# Patient Record
Sex: FEMALE | Race: WHITE | NOT HISPANIC OR LATINO | Employment: UNEMPLOYED | URBAN - METROPOLITAN AREA
[De-identification: names, ages, dates, MRNs, and addresses within clinical notes are randomized per-mention and may not be internally consistent; named-entity substitution may affect disease eponyms.]

---

## 2017-01-31 ENCOUNTER — OFFICE VISIT (OUTPATIENT)
Dept: OBSTETRICS AND GYNECOLOGY | Facility: CLINIC | Age: 32
End: 2017-01-31

## 2017-01-31 VITALS
HEIGHT: 63 IN | BODY MASS INDEX: 39.34 KG/M2 | HEART RATE: 83 BPM | DIASTOLIC BLOOD PRESSURE: 79 MMHG | SYSTOLIC BLOOD PRESSURE: 124 MMHG | WEIGHT: 222 LBS

## 2017-01-31 DIAGNOSIS — Z11.3 SCREEN FOR STD (SEXUALLY TRANSMITTED DISEASE): ICD-10-CM

## 2017-01-31 DIAGNOSIS — N89.8 VAGINAL DISCHARGE: ICD-10-CM

## 2017-01-31 DIAGNOSIS — N93.9 ABNORMAL UTERINE BLEEDING (AUB): ICD-10-CM

## 2017-01-31 DIAGNOSIS — Z01.419 ENCOUNTER FOR GYNECOLOGICAL EXAMINATION WITHOUT ABNORMAL FINDING: Primary | ICD-10-CM

## 2017-01-31 LAB
BASOPHILS # BLD AUTO: 0.01 10*3/MM3 (ref 0–0.2)
BASOPHILS NFR BLD AUTO: 0.1 % (ref 0–1.5)
EOSINOPHIL # BLD AUTO: 0.07 10*3/MM3 (ref 0–0.7)
EOSINOPHIL NFR BLD AUTO: 0.8 % (ref 0.3–6.2)
ERYTHROCYTE [DISTWIDTH] IN BLOOD BY AUTOMATED COUNT: 13.4 % (ref 11.7–13)
HCG INTACT+B SERPL-ACNC: <0.5 MIU/ML
HCT VFR BLD AUTO: 41.1 % (ref 35.6–45.5)
HGB BLD-MCNC: 13.3 G/DL (ref 11.9–15.5)
IMM GRANULOCYTES # BLD: 0.05 10*3/MM3 (ref 0–0.03)
IMM GRANULOCYTES NFR BLD: 0.6 % (ref 0–0.5)
LYMPHOCYTES # BLD AUTO: 2.68 10*3/MM3 (ref 0.9–4.8)
LYMPHOCYTES NFR BLD AUTO: 30.5 % (ref 19.6–45.3)
MCH RBC QN AUTO: 28.9 PG (ref 26.9–32)
MCHC RBC AUTO-ENTMCNC: 32.4 G/DL (ref 32.4–36.3)
MCV RBC AUTO: 89.2 FL (ref 80.5–98.2)
MONOCYTES # BLD AUTO: 0.35 10*3/MM3 (ref 0.2–1.2)
MONOCYTES NFR BLD AUTO: 4 % (ref 5–12)
NEUTROPHILS # BLD AUTO: 5.64 10*3/MM3 (ref 1.9–8.1)
NEUTROPHILS NFR BLD AUTO: 64 % (ref 42.7–76)
PLATELET # BLD AUTO: 342 10*3/MM3 (ref 140–500)
RBC # BLD AUTO: 4.61 10*6/MM3 (ref 3.9–5.2)
TSH SERPL DL<=0.005 MIU/L-ACNC: 2.53 MIU/ML (ref 0.27–4.2)
WBC # BLD AUTO: 8.8 10*3/MM3 (ref 4.5–10.7)

## 2017-01-31 PROCEDURE — 99395 PREV VISIT EST AGE 18-39: CPT | Performed by: OBSTETRICS & GYNECOLOGY

## 2017-01-31 NOTE — PROGRESS NOTES
GYN Annual Exam     CC- Here for annual exam.     Preethi Jules is a 31 y.o. female who presents for annual well woman exam. Periods are irregular, lasting several days.   Pt c/o irregular periods, unable to track for the past 2 mths. Bleeding is on/off and heavy at times. Pt has also had several bacterial infections int he past few months. Pt c/o painful intercourse. Pt's MIL has been diagnosed with Hep B and would like to be checked for that as well.     OB History      Para Term  AB TAB SAB Ectopic Multiple Living    2    1  1   1          Current contraception: none  History of abnormal Pap smear: yes - 6-7 yrs ago, NL paps since then.   Family history of uterine, colon or ovarian cancer: yes - paternal grandmother - ovarian   History of abnormal mammogram: no  Family history of breast cancer: no  Last Pap : 02/10/2015    Past Medical History   Diagnosis Date   • Anxiety    • MRSA carrier        Past Surgical History   Procedure Laterality Date   •  section     • Dilatation and curettage         No current outpatient prescriptions on file.    No Known Allergies    Social History   Substance Use Topics   • Smoking status: Former Smoker     Types: Cigarettes   • Smokeless tobacco: Never Used   • Alcohol use Yes       Family History   Problem Relation Age of Onset   • Ovarian cancer Paternal Grandmother    • Breast cancer Neg Hx    • Colon cancer Neg Hx    • Uterine cancer Neg Hx    • Deep vein thrombosis Neg Hx    • Pulmonary embolism Neg Hx        Review of Systems   Constitutional: Negative for chills, fatigue and fever.   HENT: Negative for congestion.    Eyes: Negative for visual disturbance.   Respiratory: Negative for shortness of breath.    Cardiovascular: Negative for chest pain.   Gastrointestinal: Negative for abdominal pain.   Endocrine: Negative for cold intolerance and heat intolerance.   Genitourinary: Positive for dyspareunia, menstrual problem, pelvic pain, vaginal bleeding and  "vaginal discharge. Negative for dysuria.   Musculoskeletal: Negative for arthralgias.   Skin: Negative for rash.   Allergic/Immunologic: Negative for immunocompromised state.   Neurological: Negative for dizziness.   Hematological: Negative for adenopathy.   Psychiatric/Behavioral: Negative for behavioral problems.       Visit Vitals   • /79   • Pulse 83   • Ht 63\" (160 cm)   • Wt 222 lb (101 kg)   • Breastfeeding No   • BMI 39.33 kg/m2       Physical Exam   Constitutional: She is oriented to person, place, and time. She appears well-developed and well-nourished.   HENT:   Head: Normocephalic and atraumatic.   Eyes: Conjunctivae are normal.   Neck: Normal range of motion. Neck supple. No thyromegaly present.   Cardiovascular: Normal rate and regular rhythm.    No murmur heard.  Pulmonary/Chest: Effort normal and breath sounds normal. Right breast exhibits no inverted nipple, no mass and no nipple discharge. Left breast exhibits no inverted nipple, no mass and no nipple discharge.   Abdominal: Soft. Bowel sounds are normal. She exhibits no distension. There is no tenderness.   Genitourinary: Vagina normal. Pelvic exam was performed with patient prone. There is no lesion on the right labia. There is no lesion on the left labia. Uterus is enlarged (7-8 weeks and anteverted - smooth ) and tender. Uterus is not fixed (Diminished mobility ). Cervix exhibits no motion tenderness. Right adnexum displays no mass and no tenderness. Left adnexum displays no mass and no tenderness. No bleeding in the vagina. No vaginal discharge found.   Musculoskeletal: She exhibits no edema.   Lymphadenopathy:        Right: No inguinal adenopathy present.        Left: No inguinal adenopathy present.   Neurological: She is alert and oriented to person, place, and time.   Skin: No rash noted.   Psychiatric: She has a normal mood and affect. Her behavior is normal.          Assessment     1) GYN annual well woman exam.   2) AUB -IMB, will " start work up associated with pelvic pain and painful intercourse.   Check cultres and labs, along with ultrasound return to initiate treatment.   3) Vaginal discharge, responded short term to flagyl, check full culture and treat   4) hep B - Family members     Plan     1) Breast Health - Clinical breast exam yearly, Self breast awareness monthly  2) Pap - up to date   3) Smoking status- non-smoker   4) Seat belts recommended  5) Follow up prn and one year.     Braulio Olson MD   1/31/2017  9:01 AM

## 2017-01-31 NOTE — MR AVS SNAPSHOT
"Preethi Jules   1/31/2017 8:45 AM   Office Visit    Dept Phone:  [phone]   Encounter #:  [id number]    Provider:  Braulio Olson MD   Department:  [facility]                Your Full Care Plan              Your Updated Medication List      Notice  As of 1/31/2017  9:40 AM    You have not been prescribed any medications.            We Performed the Following     CBC & Differential     HCG, B-subunit, Quantitative     Hepatitis B Surface Antibody     Hepatitis B Surface Antigen     NuSwab Vaginitis Plus     Prolactin     TSH Rfx On Abnormal To Free T4       You Were Diagnosed With        Codes Comments    Encounter for gynecological examination without abnormal finding    -  Primary ICD-10-CM: Z01.419  ICD-9-CM: V72.31     Abnormal uterine bleeding (AUB)     ICD-10-CM: N93.9  ICD-9-CM: 626.9     Vaginal discharge     ICD-10-CM: N89.8  ICD-9-CM: 623.5     Screen for STD (sexually transmitted disease)     ICD-10-CM: Z11.3  ICD-9-CM: V74.5       Instructions     None    Patient Instructions History      Upcoming Appointments     Visit Type Date Time Department    GYN FOLLOW UP 1/31/2017  8:45 AM IVORY REYNOLDS      MyChart Signup     Our records indicate that you have declined [facility] [employer] signup. If you would like to sign up for [employer], please email [email or web address] or call [phone] to obtain an activation code.             Other Info from Your Visit           Allergies     No Known Allergies      Reason for Visit     Gynecologic Exam     Menstrual Problem           Vital Signs     Blood Pressure Pulse Height Weight Breastfeeding? Body Mass Index    124/79 83 63\" (160 cm) 222 lb (101 kg) No 39.33 kg/m2    Smoking Status                   Former Smoker           Problems and Diagnoses Noted     Encounter for routine gynecological examination    -  Primary    Abnormal bleeding from uterus        Discharge from the vagina       " Screening for STDs (sexually transmitted diseases)

## 2017-02-01 ENCOUNTER — TELEPHONE (OUTPATIENT)
Dept: OBSTETRICS AND GYNECOLOGY | Facility: CLINIC | Age: 32
End: 2017-02-01

## 2017-02-01 ENCOUNTER — PROCEDURE VISIT (OUTPATIENT)
Dept: OBSTETRICS AND GYNECOLOGY | Facility: CLINIC | Age: 32
End: 2017-02-01

## 2017-02-01 DIAGNOSIS — N93.9 ABNORMAL UTERINE BLEEDING (AUB): ICD-10-CM

## 2017-02-01 DIAGNOSIS — R10.2 PELVIC PAIN: Primary | ICD-10-CM

## 2017-02-01 LAB
HBV SURFACE AB SER QL: REACTIVE
HBV SURFACE AG SERPL QL IA: NEGATIVE
PROLACTIN SERPL-MCNC: 6.8 NG/ML (ref 4.8–23.3)

## 2017-02-01 PROCEDURE — 76830 TRANSVAGINAL US NON-OB: CPT | Performed by: OBSTETRICS & GYNECOLOGY

## 2017-02-01 NOTE — TELEPHONE ENCOUNTER
----- Message from Enid Munguia MA sent at 2/1/2017 11:47 AM EST -----  L.m for pt/candace  ----- Message -----     From: Enid Munguia MA     Sent: 1/31/2017   5:07 PM       To: Enid Munguia MA L.m for pt/candace  ----- Message -----     From: Braulio Olson MD     Sent: 1/31/2017   4:38 PM       To: DIVYA Ovalle, her labs are normal. Please let her know. Thanks Dr. Olson

## 2017-02-01 NOTE — TELEPHONE ENCOUNTER
----- Message from Enid Munguia MA sent at 2/1/2017 11:47 AM EST -----  CARRIE.m for pt/candace  ----- Message -----     From: Braulio Olson MD     Sent: 2/1/2017  10:36 AM       To: Enid Munguia MA    Enid, Prolactin is normal. Appears to have immunity to Hep B (likely vaccinated as a kid).  No evidence of active infection. Please let her know. Thanks Dr. Olson

## 2017-02-01 NOTE — TELEPHONE ENCOUNTER
----- Message from Stephanie Viera sent at 2/1/2017  7:46 AM EST -----  Contact: Patient  Patient states she missed a call from you.  I looked in her chart and saw Dr. Olson's note on one of her lab results that all her blood work was normal.  I notified patient.

## 2017-02-04 LAB
A VAGINAE DNA VAG QL NAA+PROBE: NORMAL SCORE
BVAB2 DNA VAG QL NAA+PROBE: NORMAL SCORE
C ALBICANS DNA VAG QL NAA+PROBE: NEGATIVE
C GLABRATA DNA VAG QL NAA+PROBE: NEGATIVE
C TRACH RRNA SPEC QL NAA+PROBE: NEGATIVE
MEGA1 DNA VAG QL NAA+PROBE: NORMAL SCORE
N GONORRHOEA RRNA SPEC QL NAA+PROBE: NEGATIVE
T VAGINALIS RRNA SPEC QL NAA+PROBE: NEGATIVE

## 2017-02-08 ENCOUNTER — TELEPHONE (OUTPATIENT)
Dept: OBSTETRICS AND GYNECOLOGY | Facility: CLINIC | Age: 32
End: 2017-02-08

## 2017-02-08 NOTE — TELEPHONE ENCOUNTER
----- Message from Enid Munguia MA sent at 2/7/2017  5:23 PM EST -----  L.m for pt/candace  ----- Message -----     From: Braulio Olson MD     Sent: 2/6/2017  12:28 PM       To: DIVYA Ovalle, negative vaginal culture please review. Thanks Dr. Olson

## 2017-02-23 ENCOUNTER — PROCEDURE VISIT (OUTPATIENT)
Dept: OBSTETRICS AND GYNECOLOGY | Facility: CLINIC | Age: 32
End: 2017-02-23

## 2017-02-23 VITALS
HEIGHT: 63 IN | HEART RATE: 69 BPM | WEIGHT: 223 LBS | DIASTOLIC BLOOD PRESSURE: 71 MMHG | BODY MASS INDEX: 39.51 KG/M2 | SYSTOLIC BLOOD PRESSURE: 118 MMHG

## 2017-02-23 DIAGNOSIS — R93.89 THICKENED ENDOMETRIUM: Primary | ICD-10-CM

## 2017-02-23 DIAGNOSIS — N93.9 ABNORMAL UTERINE BLEEDING (AUB): ICD-10-CM

## 2017-02-23 LAB
B-HCG UR QL: NEGATIVE
INTERNAL NEGATIVE CONTROL: NEGATIVE
INTERNAL POSITIVE CONTROL: POSITIVE
Lab: NORMAL

## 2017-02-23 PROCEDURE — 58100 BIOPSY OF UTERUS LINING: CPT | Performed by: OBSTETRICS & GYNECOLOGY

## 2017-02-23 PROCEDURE — 99213 OFFICE O/P EST LOW 20 MIN: CPT | Performed by: OBSTETRICS & GYNECOLOGY

## 2017-02-23 PROCEDURE — 81025 URINE PREGNANCY TEST: CPT | Performed by: OBSTETRICS & GYNECOLOGY

## 2017-02-23 RX ORDER — NORGESTIMATE AND ETHINYL ESTRADIOL 0.25-0.035
1 KIT ORAL DAILY
Qty: 1 PACKAGE | Refills: 12 | Status: SHIPPED | OUTPATIENT
Start: 2017-02-23 | End: 2021-06-08

## 2017-02-23 NOTE — PROGRESS NOTES
"Subjective   Preethi Jules is a 31 y.o. female here for endometrial biopsy for thickened endometrium.     History of Present Illness     31 y.o. female who we worked up for abnormal uterine bleeding. Here to discuss results and finish testing.         Review of Systems   Constitutional: Negative for chills and fever.   Gastrointestinal: Negative for abdominal pain, nausea and vomiting.   Genitourinary: Positive for menstrual problem and pelvic pain. Negative for vaginal discharge.       Objective    Visit Vitals   • /71   • Pulse 69   • Ht 63\" (160 cm)   • Wt 223 lb (101 kg)   • LMP 02/22/2017   • Breastfeeding No   • BMI 39.5 kg/m2       Physical Exam   Constitutional: She appears well-developed and well-nourished. No distress.   Abdominal: Soft. Bowel sounds are normal. She exhibits no distension. There is no tenderness.   Genitourinary: Pelvic exam was performed with patient prone. There is no lesion on the right labia. There is no lesion on the left labia. Cervix exhibits no motion tenderness. There is bleeding in the vagina. No vaginal discharge found.   Lymphadenopathy:        Right: No inguinal adenopathy present.        Left: No inguinal adenopathy present.     Endometrial Biopsy  Date/Time: 2/23/2017 2:05 PM  Performed by: VINCENT HERRMANN  Authorized by: VINCENT HERRMANN   Preparation: Patient was prepped and draped in the usual sterile fashion.  Local anesthesia used: no    Anesthesia:  Local anesthesia used: no  Sedation:  Patient sedated: no    Patient tolerance of procedure: Had some trouble as desribed below with pain.  Comments: Cervix isolated and washed with betadine x 2.   Cervix grasped with tenaculum and pulled forward.   Pipelle passed to 3-4 cm and stopped.   Sound used to easily pass to 8 cm  Redirected Pipelle to 7-8 cm   Small amount of tissue obtained.   Had trouble with pain at internal cervical os, but able to tolerate procedure in the end.   Will send specimen for study. "   Follow up based on results.           Assessment/Plan     1) AUB-IMB, suspect ovarian dysfunction/PCOS  Work up concerning for EL > 1 cm, here to do biopsy and start treatment.   Reviewed findings and need for tissue as well as treatment. Ok to start OCP  How to start  Common/life threatening issues  Efficiency all reviewed.     2) thick EL on ultrasound  Tissue obtained to rule out hyperplasia as above.     Braulio Olson MD  2/23/2017  2:11 PM

## 2017-02-27 LAB
DX ICD CODE: NORMAL
PATH REPORT.FINAL DX SPEC: NORMAL
PATH REPORT.GROSS SPEC: NORMAL
PATH REPORT.SITE OF ORIGIN SPEC: NORMAL
PATHOLOGIST NAME: NORMAL
PAYMENT PROCEDURE: NORMAL

## 2017-02-28 ENCOUNTER — TELEPHONE (OUTPATIENT)
Dept: OBSTETRICS AND GYNECOLOGY | Facility: CLINIC | Age: 32
End: 2017-02-28

## 2017-02-28 NOTE — TELEPHONE ENCOUNTER
----- Message from Braulio Olson MD sent at 2/27/2017  4:42 PM EST -----  Enid, her biopsy was normal. Please let her know. Thanks Dr. Olson

## 2017-03-01 ENCOUNTER — TELEPHONE (OUTPATIENT)
Dept: OBSTETRICS AND GYNECOLOGY | Facility: CLINIC | Age: 32
End: 2017-03-01

## 2017-03-01 RX ORDER — PROMETHAZINE HYDROCHLORIDE 25 MG/1
25 TABLET ORAL EVERY 6 HOURS PRN
Qty: 30 TABLET | Refills: 2 | Status: SHIPPED | OUTPATIENT
Start: 2017-03-01 | End: 2021-06-08

## 2017-03-22 ENCOUNTER — TELEPHONE (OUTPATIENT)
Dept: OBSTETRICS AND GYNECOLOGY | Facility: CLINIC | Age: 32
End: 2017-03-22

## 2017-03-22 NOTE — TELEPHONE ENCOUNTER
Enid,     I talked with her and this is expected as we get control of her cycle. She is taking tylenol not motrin. Encouraged to get both and start taking alternating doses. Ok to give note for work today and potentially tomorrow. Please call and arrange note.      Thanks     Dr. Olson    ----- Message from Enid Munguia MA sent at 3/22/2017  8:33 AM EDT -----  Contact: PT CALL       ----- Message -----     From: Ta Gray     Sent: 3/22/2017   8:25 AM       To: Braulio Olson MD, Enid Munguia MA    PT CALLED IN SHE IS HAVING VERY BAD PERIOD. THIS HER FIRST MONTH ON BIRTH CONTROL PILLS AND SHE IS WONDERING IF YOU HAVE ANY SUGGESTIONS ON WHAT SHE CAN TAKE FOR THE CRAMPING BECAUSE SHE STATES SHE IS IN PAIN WHEN SHE TRIES TO STAND  OR MOVE AROUND? SHE ,INFORMED ME THAT SHE HAS BEEN TAKING IBUPROFEN  FOR 3 DAYS AND THE PAIN HAS NOT SUBSIDED.  PLEASE [facility] PT @[phone].    THANK YOU-    TA

## 2017-05-03 ENCOUNTER — TELEPHONE (OUTPATIENT)
Dept: OBSTETRICS AND GYNECOLOGY | Facility: CLINIC | Age: 32
End: 2017-05-03

## 2017-09-19 ENCOUNTER — TREATMENT (OUTPATIENT)
Dept: PHYSICAL THERAPY | Facility: CLINIC | Age: 32
End: 2017-09-19

## 2017-09-19 DIAGNOSIS — Z02.1 DRUG SCREENING, PRE-EMPLOYMENT: Primary | ICD-10-CM

## 2017-09-19 PROCEDURE — PDS: Performed by: PHYSICAL THERAPIST

## 2019-06-11 ENCOUNTER — TELEPHONE (OUTPATIENT)
Dept: OBSTETRICS AND GYNECOLOGY | Facility: CLINIC | Age: 34
End: 2019-06-11

## 2019-06-11 NOTE — TELEPHONE ENCOUNTER
Called pt about n/s on 6/7/19. Pt states she had a lot going on that day and she forgot.  Pt states she is no longer having the issue. She wanted to schedule for an annual exam, scheduled.[email or web address]

## 2019-08-09 ENCOUNTER — TELEPHONE (OUTPATIENT)
Dept: OBSTETRICS AND GYNECOLOGY | Facility: CLINIC | Age: 34
End: 2019-08-09

## 2020-06-16 ENCOUNTER — LAB REQUISITION (OUTPATIENT)
Dept: LAB | Facility: HOSPITAL | Age: 35
End: 2020-06-16

## 2020-06-16 DIAGNOSIS — N20.1 CALCULUS OF URETER: ICD-10-CM

## 2020-06-16 PROCEDURE — 82365 CALCULUS SPECTROSCOPY: CPT | Performed by: UROLOGY

## 2020-06-26 LAB
CAHPO4 CRY STONE QL IR: 20 %
COLOR STONE: NORMAL
COM CRY STONE QL IR: 40 %
COMPN STONE: NORMAL
HYDROXYAPATITE: 40 %
LABORATORY COMMENT REPORT: NORMAL
Lab: NORMAL
Lab: NORMAL
PHOTO: NORMAL
SIZE STONE: NORMAL MM
SPECIMEN SOURCE: NORMAL
WT STONE: 20 MG

## 2020-10-16 ENCOUNTER — TELEPHONE (OUTPATIENT)
Dept: OBSTETRICS AND GYNECOLOGY | Facility: CLINIC | Age: 35
End: 2020-10-16

## 2021-06-08 ENCOUNTER — OFFICE VISIT (OUTPATIENT)
Dept: OBSTETRICS AND GYNECOLOGY | Facility: CLINIC | Age: 36
End: 2021-06-08

## 2021-06-08 VITALS
WEIGHT: 185 LBS | HEIGHT: 64 IN | HEART RATE: 81 BPM | SYSTOLIC BLOOD PRESSURE: 128 MMHG | BODY MASS INDEX: 31.58 KG/M2 | DIASTOLIC BLOOD PRESSURE: 86 MMHG

## 2021-06-08 DIAGNOSIS — Z01.419 ENCOUNTER FOR GYNECOLOGICAL EXAMINATION WITHOUT ABNORMAL FINDING: Primary | ICD-10-CM

## 2021-06-08 DIAGNOSIS — R10.2 PELVIC PAIN: ICD-10-CM

## 2021-06-08 PROCEDURE — 99212 OFFICE O/P EST SF 10 MIN: CPT | Performed by: OBSTETRICS & GYNECOLOGY

## 2021-06-08 PROCEDURE — 99385 PREV VISIT NEW AGE 18-39: CPT | Performed by: OBSTETRICS & GYNECOLOGY

## 2021-06-08 NOTE — PROGRESS NOTES
GYN Annual Exam     CC- Here for annual exam.     Preethi Jules is a 35 y.o. female who presents for annual well woman exam. Periods are regular every 4-6 weeks, lasting 5 days. Dysmenorrhea:severe, occurring first 1-2 days of flow getting better as the period progresses.  Cyclic symptoms include none. No intermenstrual bleeding, spotting, or discharge.  Pt has a bulge or knot in the pubic area. Pt noticed a few months ago. Pt has a pain the same area during climax.     OB History        2    Para        Term                AB   1    Living   1       SAB   1    TAB        Ectopic        Molar        Multiple        Live Births                    Current contraception: none  History of abnormal Pap smear: Yes, no treatment required  Family history of uterine, colon or ovarian cancer: yes - paternal grandmother - ovarian cancer   History of abnormal mammogram: no  Family history of breast cancer: no  Last Pap : - NL    Past Medical History:   Diagnosis Date   • Anxiety    • Kidney stones    • MRSA carrier        Past Surgical History:   Procedure Laterality Date   • CARPAL TUNNEL RELEASE     •  SECTION     • DILATATION AND CURETTAGE         No current outpatient medications on file.    No Known Allergies    Social History     Tobacco Use   • Smoking status: Former Smoker     Types: Cigarettes   • Smokeless tobacco: Never Used   Substance Use Topics   • Alcohol use: Yes   • Drug use: No       Family History   Problem Relation Age of Onset   • Ovarian cancer Paternal Grandmother    • Lung cancer Father    • Breast cancer Neg Hx    • Colon cancer Neg Hx    • Uterine cancer Neg Hx    • Deep vein thrombosis Neg Hx    • Pulmonary embolism Neg Hx        Review of Systems   Constitutional: Negative for chills and fever.   Gastrointestinal: Negative for abdominal pain, constipation and diarrhea.   Genitourinary: Positive for pelvic pain. Negative for menstrual problem, vaginal bleeding and vaginal  "discharge.   All other systems reviewed and are negative.      /86   Pulse 81   Ht 162.6 cm (64\")   Wt 83.9 kg (185 lb)   Breastfeeding No   BMI 31.76 kg/m²     Physical Exam  Constitutional:       General: She is not in acute distress.     Appearance: She is well-developed and normal weight.   Genitourinary:      Pelvic exam was performed with patient supine.      Vulva, vagina, right adnexa and left adnexa normal.      No vulval lesion or Bartholin's cyst noted.      No inguinal adenopathy present in the right or left side.     No vaginal discharge or bleeding.      No cervical motion tenderness or friability.      Uterus is enlarged and irregular.      Uterus is not tender.      No uterine mass detected.     Uterus is anteverted and regular.      No right or left adnexal mass present.      Right adnexa not tender or full.      Left adnexa not tender or full.   HENT:      Head: Normocephalic and atraumatic.      Nose: Nose normal.   Eyes:      Conjunctiva/sclera: Conjunctivae normal.      Pupils: Pupils are equal, round, and reactive to light.   Neck:      Thyroid: No thyromegaly.   Cardiovascular:      Rate and Rhythm: Normal rate and regular rhythm.      Heart sounds: Normal heart sounds. No murmur heard.     Pulmonary:      Effort: Pulmonary effort is normal. No respiratory distress.      Breath sounds: Normal breath sounds.   Chest:      Breasts:         Right: No inverted nipple, mass or nipple discharge.         Left: No inverted nipple, mass or nipple discharge.   Abdominal:      General: Abdomen is flat. There is no distension.      Palpations: Abdomen is soft.      Tenderness: There is no abdominal tenderness.   Musculoskeletal:         General: No deformity. Normal range of motion.      Cervical back: Normal range of motion and neck supple.      Right lower leg: No edema.      Left lower leg: No edema.   Lymphadenopathy:      Lower Body: No right inguinal adenopathy. No left inguinal " "adenopathy.   Neurological:      Mental Status: She is alert and oriented to person, place, and time.   Skin:     General: Skin is warm and dry.      Findings: No erythema.   Psychiatric:         Behavior: Behavior normal.         Thought Content: Thought content normal.         Judgment: Judgment normal.   Vitals reviewed. Exam conducted with a chaperone present.               Assessment     Diagnoses and all orders for this visit:    1. Encounter for gynecological examination without abnormal finding (Primary)  -     IGP, Apt HPV,rfx 16 / 18,45    2. Pelvic pain  -     Urine Culture - , Urine, Clean Catch  -     US Non-ob Transvaginal    1) GYN exam   Updated with her  2) Pelvic pain and \"knot\"  Uterus in 2017 was 9 cm long with 2 cm anterior fibroid   Check again as feels larger today   Options reviewed. (hormonal suppression vs surgical treatments)      Plan     1) Breast Health - Clinical breast exam yearly, Discussed American cancer society recommendations for breast cancer screening, and Self breast awareness monthly  2) Pap - Updated today   3) Smoking status- non-smoker   4) Encouraged to be wary of information obtained via social media and internet based on source and search.  5) Follow up prn and one year.       Braulio Olson MD   6/8/2021  15:13 EDT  "

## 2021-06-11 LAB
CYTOLOGIST CVX/VAG CYTO: NORMAL
CYTOLOGY CVX/VAG DOC CYTO: NORMAL
CYTOLOGY CVX/VAG DOC THIN PREP: NORMAL
DX ICD CODE: NORMAL
HIV 1 & 2 AB SER-IMP: NORMAL
HPV I/H RISK 4 DNA CVX QL PROBE+SIG AMP: NEGATIVE
OTHER STN SPEC: NORMAL
STAT OF ADQ CVX/VAG CYTO-IMP: NORMAL

## 2021-06-28 ENCOUNTER — OFFICE VISIT (OUTPATIENT)
Dept: OBSTETRICS AND GYNECOLOGY | Facility: CLINIC | Age: 36
End: 2021-06-28

## 2021-06-28 VITALS
WEIGHT: 181 LBS | SYSTOLIC BLOOD PRESSURE: 130 MMHG | HEART RATE: 75 BPM | BODY MASS INDEX: 30.9 KG/M2 | HEIGHT: 64 IN | DIASTOLIC BLOOD PRESSURE: 78 MMHG

## 2021-06-28 DIAGNOSIS — Z30.2 ENCOUNTER FOR STERILIZATION: ICD-10-CM

## 2021-06-28 DIAGNOSIS — R10.2 PELVIC PAIN: Primary | ICD-10-CM

## 2021-06-28 DIAGNOSIS — D25.1 INTRAMURAL UTERINE FIBROID: ICD-10-CM

## 2021-06-28 DIAGNOSIS — N93.9 ABNORMAL UTERINE BLEEDING (AUB): ICD-10-CM

## 2021-06-28 PROCEDURE — 99214 OFFICE O/P EST MOD 30 MIN: CPT | Performed by: OBSTETRICS & GYNECOLOGY

## 2021-06-28 NOTE — PROGRESS NOTES
"Subjective    is a 35 y.o. female following up from US for pelvic pain.     Chief Complaint   Patient presents with   • Follow-up        HPI     35 y.o.    Here for bleeding and pelvic pain   In 2017 had 2 cm fibroid       Review of Systems   Constitutional: Negative for chills and fever.   Gastrointestinal: Negative for abdominal pain.   Genitourinary: Positive for menstrual problem and pelvic pain. Negative for dysuria, vaginal bleeding and vaginal discharge.        Objective   /78   Pulse 75   Ht 162.6 cm (64\")   Wt 82.1 kg (181 lb)   Breastfeeding No   BMI 31.07 kg/m²   Physical Exam  Constitutional:       General: She is not in acute distress.     Appearance: Normal appearance. She is well-developed and normal weight.   Neck:      Thyroid: No thyromegaly.   Pulmonary:      Effort: No respiratory distress.   Abdominal:      General: Abdomen is flat. There is no distension.      Palpations: Abdomen is soft.      Tenderness: There is no abdominal tenderness.   Musculoskeletal:      Right lower leg: No edema.      Left lower leg: No edema.   Neurological:      Mental Status: She is alert and oriented to person, place, and time.   Skin:     General: Skin is warm and dry.   Psychiatric:         Behavior: Behavior normal.         Thought Content: Thought content normal.         Judgment: Judgment normal.   Vitals reviewed.         Ultrasound   9.3 cm uterus (was 9.0 cm in 2017)  Fibroid 2.8 cm (was 2.0 cm in 2017)       Assessment/Plan   Diagnoses and all orders for this visit:    1. Pelvic pain (Primary)  -     Case Request; Standing  -     CBC and Differential; Future  -     Case Request    2. Abnormal uterine bleeding (AUB)  -     Case Request; Standing  -     CBC and Differential; Future  -     Case Request    3. Intramural uterine fibroid  -     Case Request; Standing  -     CBC and Differential; Future  -     Case Request    4. Encounter for sterilization  -     Case Request; Standing  -     " CBC and Differential; Future  -     Case Request    Other orders  -     Follow Anesthesia Guidelines / Standing Orders; Future  -     Chlorhexidine Skin Prep; Future  -     Follow Anesthesia Guidelines / Standing Orders; Standing  -     Obtain informed consent; Standing  -     Verify NPO Status; Standing  -     Verify The Time Patient Completed ERAS Hydration Drink; Standing  -     Verify / Perform Chlorhexidine Skin Prep; Standing    1) Pelvic pain and AUB  Likely related to uterine enlargement from fibroid   Initial treatment reviewed.   OCP, IUD or Ablation   Wants to trial ablation   Management options discussed at length today including expectant, medical, and surgical. Patient elects for an endometrial ablation. Discussed high rates of satisfaction (around 90 %) but also the potential for failure and persistent heavy or painful periods with potential need for future surgery, including hysterectomy. Patient verbalized understanding. Discussed risks of surgery including bleeding, transfusion, infection, scaring, dyspareunia, irregular periods post op, post op pain, uterine perforation and potential need for surgical correction, inadvertent injury to GI/ structures and potential need for surgical correction, DVT, anesthesia complications, organ failure, nerve damage, and death. Discussed preop process and typicaly post op/recovery. Will plan hysteroscopy, D&C, ablation.     Needs sterilization for contraception   Various options discussed including natural family planning and abstinence. Pt still wishes for permanent sterilization.The risks, benefits, and alternatives were discussed including surgery risks such as failure of sterilization method with future pregnancy, ectopic pregnancy, bleeding, infection, scar, pain, wound breakdown, conversion to open approach, GI/ injury, DVT, nerve damage, organ failure, anesthesia complications, and death. Discussed preop and typical recovery. Discussed risk of future  pregnancy and regret. All questions answered.     Desires to do 8/13/21   Will look to schedule then     Braulio Olson MD   6/28/2021  16:15 EDT

## 2021-06-30 PROBLEM — Z30.2 ENCOUNTER FOR STERILIZATION: Status: ACTIVE | Noted: 2021-06-30

## 2021-06-30 PROBLEM — N93.9 ABNORMAL UTERINE BLEEDING (AUB): Status: ACTIVE | Noted: 2021-06-30

## 2021-06-30 PROBLEM — D25.1 INTRAMURAL UTERINE FIBROID: Status: ACTIVE | Noted: 2021-06-30

## 2021-06-30 PROBLEM — R10.2 PELVIC PAIN: Status: ACTIVE | Noted: 2021-06-30

## 2021-08-11 ENCOUNTER — PRE-ADMISSION TESTING (OUTPATIENT)
Dept: PREADMISSION TESTING | Facility: HOSPITAL | Age: 36
End: 2021-08-11

## 2021-08-11 ENCOUNTER — TELEPHONE (OUTPATIENT)
Dept: OBSTETRICS AND GYNECOLOGY | Facility: CLINIC | Age: 36
End: 2021-08-11

## 2021-08-11 VITALS
DIASTOLIC BLOOD PRESSURE: 76 MMHG | HEIGHT: 64 IN | SYSTOLIC BLOOD PRESSURE: 110 MMHG | HEART RATE: 64 BPM | TEMPERATURE: 97.5 F | BODY MASS INDEX: 32.1 KG/M2 | WEIGHT: 188 LBS | OXYGEN SATURATION: 100 % | RESPIRATION RATE: 20 BRPM

## 2021-08-11 DIAGNOSIS — F41.9 ANXIETY DUE TO INVASIVE PROCEDURE: Primary | ICD-10-CM

## 2021-08-11 LAB
ANION GAP SERPL CALCULATED.3IONS-SCNC: 11.4 MMOL/L (ref 5–15)
BUN SERPL-MCNC: 9 MG/DL (ref 6–20)
BUN/CREAT SERPL: 13.8 (ref 7–25)
CALCIUM SPEC-SCNC: 9.2 MG/DL (ref 8.6–10.5)
CHLORIDE SERPL-SCNC: 102 MMOL/L (ref 98–107)
CO2 SERPL-SCNC: 23.6 MMOL/L (ref 22–29)
CREAT SERPL-MCNC: 0.65 MG/DL (ref 0.57–1)
GFR SERPL CREATININE-BSD FRML MDRD: 105 ML/MIN/1.73
GLUCOSE SERPL-MCNC: 90 MG/DL (ref 65–99)
HCG SERPL QL: NEGATIVE
POTASSIUM SERPL-SCNC: 4.5 MMOL/L (ref 3.5–5.2)
SARS-COV-2 ORF1AB RESP QL NAA+PROBE: NOT DETECTED
SODIUM SERPL-SCNC: 137 MMOL/L (ref 136–145)

## 2021-08-11 PROCEDURE — 84703 CHORIONIC GONADOTROPIN ASSAY: CPT

## 2021-08-11 PROCEDURE — 87086 URINE CULTURE/COLONY COUNT: CPT | Performed by: OBSTETRICS & GYNECOLOGY

## 2021-08-11 PROCEDURE — 80048 BASIC METABOLIC PNL TOTAL CA: CPT

## 2021-08-11 PROCEDURE — 85025 COMPLETE CBC W/AUTO DIFF WBC: CPT | Performed by: OBSTETRICS & GYNECOLOGY

## 2021-08-11 PROCEDURE — C9803 HOPD COVID-19 SPEC COLLECT: HCPCS | Performed by: NURSE PRACTITIONER

## 2021-08-11 PROCEDURE — 36415 COLL VENOUS BLD VENIPUNCTURE: CPT

## 2021-08-11 PROCEDURE — U0004 COV-19 TEST NON-CDC HGH THRU: HCPCS | Performed by: NURSE PRACTITIONER

## 2021-08-11 RX ORDER — OMEPRAZOLE 40 MG/1
40 CAPSULE, DELAYED RELEASE ORAL DAILY PRN
COMMUNITY
End: 2021-08-27

## 2021-08-11 RX ORDER — CHLORHEXIDINE GLUCONATE 500 MG/1
1 CLOTH TOPICAL TAKE AS DIRECTED
COMMUNITY
End: 2021-08-18

## 2021-08-11 RX ORDER — DIAZEPAM 5 MG/1
5 TABLET ORAL ONCE
Qty: 1 TABLET | Refills: 0 | Status: SHIPPED | OUTPATIENT
Start: 2021-08-11 | End: 2021-08-11

## 2021-08-11 RX ORDER — ACETAMINOPHEN 500 MG
1000 TABLET ORAL EVERY 6 HOURS PRN
COMMUNITY
End: 2021-08-27

## 2021-08-11 NOTE — TELEPHONE ENCOUNTER
Enid,     I sent one pill for tonight to help. Should not take anything like that in the morning that will interfere with the drugs they are giving her. They will be fine to help her once she is settled at the hospital. Please let her know    Thanks,   Dr. Olson

## 2021-08-11 NOTE — TELEPHONE ENCOUNTER
Good afternoon,  Patient is calling to see if provider can call in something to help calm her nerves before her surgery on Friday. Patient is very nervous about this upcoming surgery and does not want to come in morning of and her BP be to high because she is nervous and would like something to take day before to help with that if possible.      Please advise,  Thank you

## 2021-08-12 ENCOUNTER — TELEPHONE (OUTPATIENT)
Dept: OBSTETRICS AND GYNECOLOGY | Facility: CLINIC | Age: 36
End: 2021-08-12

## 2021-08-12 LAB — BACTERIA SPEC AEROBE CULT: NORMAL

## 2021-08-12 NOTE — TELEPHONE ENCOUNTER
Dr. Geremias Hughes moving her  Friday (tomorrow). Can we move her up to 1 PM?    Thanks,   Dr. Olson

## 2021-08-12 NOTE — TELEPHONE ENCOUNTER
----- Message from Braulio Olson MD sent at 8/12/2021 10:56 AM EDT -----  Enid, normal urine culture. Please let her know. Thanks Dr. Olson

## 2021-08-13 ENCOUNTER — ANESTHESIA EVENT (OUTPATIENT)
Dept: PERIOP | Facility: HOSPITAL | Age: 36
End: 2021-08-13

## 2021-08-13 ENCOUNTER — HOSPITAL ENCOUNTER (OUTPATIENT)
Facility: HOSPITAL | Age: 36
Setting detail: HOSPITAL OUTPATIENT SURGERY
Discharge: HOME | End: 2021-08-13
Attending: OBSTETRICS & GYNECOLOGY | Admitting: OBSTETRICS & GYNECOLOGY

## 2021-08-13 ENCOUNTER — ANESTHESIA (OUTPATIENT)
Dept: PERIOP | Facility: HOSPITAL | Age: 36
End: 2021-08-13

## 2021-08-13 VITALS
SYSTOLIC BLOOD PRESSURE: 111 MMHG | OXYGEN SATURATION: 99 % | DIASTOLIC BLOOD PRESSURE: 69 MMHG | RESPIRATION RATE: 16 BRPM | WEIGHT: 186.6 LBS | BODY MASS INDEX: 31.86 KG/M2 | TEMPERATURE: 97.6 F | HEART RATE: 70 BPM | HEIGHT: 64 IN

## 2021-08-13 DIAGNOSIS — N93.9 ABNORMAL UTERINE BLEEDING (AUB): ICD-10-CM

## 2021-08-13 DIAGNOSIS — Z30.2 ENCOUNTER FOR STERILIZATION: Primary | ICD-10-CM

## 2021-08-13 DIAGNOSIS — D25.1 INTRAMURAL UTERINE FIBROID: ICD-10-CM

## 2021-08-13 DIAGNOSIS — R10.2 PELVIC PAIN: ICD-10-CM

## 2021-08-13 PROCEDURE — 58563 HYSTEROSCOPY ABLATION: CPT | Performed by: OBSTETRICS & GYNECOLOGY

## 2021-08-13 PROCEDURE — 25010000002 NEOSTIGMINE 5 MG/10ML SOLUTION: Performed by: NURSE ANESTHETIST, CERTIFIED REGISTERED

## 2021-08-13 PROCEDURE — 58670 LAPAROSCOPY TUBAL CAUTERY: CPT | Performed by: OBSTETRICS & GYNECOLOGY

## 2021-08-13 PROCEDURE — 25010000002 ONDANSETRON PER 1 MG: Performed by: ANESTHESIOLOGY

## 2021-08-13 PROCEDURE — 25010000002 DEXAMETHASONE PER 1 MG: Performed by: ANESTHESIOLOGY

## 2021-08-13 PROCEDURE — 25010000002 HYDROMORPHONE PER 4 MG: Performed by: NURSE ANESTHETIST, CERTIFIED REGISTERED

## 2021-08-13 PROCEDURE — 25010000002 HYDROMORPHONE PER 4 MG: Performed by: ANESTHESIOLOGY

## 2021-08-13 PROCEDURE — 25010000002 FENTANYL CITRATE (PF) 50 MCG/ML SOLUTION: Performed by: ANESTHESIOLOGY

## 2021-08-13 PROCEDURE — 25010000002 PROPOFOL 10 MG/ML EMULSION: Performed by: ANESTHESIOLOGY

## 2021-08-13 PROCEDURE — 25010000002 MIDAZOLAM PER 1 MG: Performed by: ANESTHESIOLOGY

## 2021-08-13 PROCEDURE — 25010000002 KETOROLAC TROMETHAMINE PER 15 MG: Performed by: ANESTHESIOLOGY

## 2021-08-13 RX ORDER — KETOROLAC TROMETHAMINE 30 MG/ML
INJECTION, SOLUTION INTRAMUSCULAR; INTRAVENOUS AS NEEDED
Status: DISCONTINUED | OUTPATIENT
Start: 2021-08-13 | End: 2021-08-13 | Stop reason: SURG

## 2021-08-13 RX ORDER — DIPHENHYDRAMINE HYDROCHLORIDE 50 MG/ML
12.5 INJECTION INTRAMUSCULAR; INTRAVENOUS
Status: DISCONTINUED | OUTPATIENT
Start: 2021-08-13 | End: 2021-08-13 | Stop reason: HOSPADM

## 2021-08-13 RX ORDER — ONDANSETRON 2 MG/ML
4 INJECTION INTRAMUSCULAR; INTRAVENOUS ONCE AS NEEDED
Status: COMPLETED | OUTPATIENT
Start: 2021-08-13 | End: 2021-08-13

## 2021-08-13 RX ORDER — IBUPROFEN 600 MG/1
600 TABLET ORAL ONCE AS NEEDED
Status: DISCONTINUED | OUTPATIENT
Start: 2021-08-13 | End: 2021-08-13 | Stop reason: HOSPADM

## 2021-08-13 RX ORDER — ROCURONIUM BROMIDE 10 MG/ML
INJECTION, SOLUTION INTRAVENOUS AS NEEDED
Status: DISCONTINUED | OUTPATIENT
Start: 2021-08-13 | End: 2021-08-13 | Stop reason: SURG

## 2021-08-13 RX ORDER — PROMETHAZINE HYDROCHLORIDE 25 MG/1
25 SUPPOSITORY RECTAL ONCE AS NEEDED
Status: DISCONTINUED | OUTPATIENT
Start: 2021-08-13 | End: 2021-08-13 | Stop reason: HOSPADM

## 2021-08-13 RX ORDER — HYDRALAZINE HYDROCHLORIDE 20 MG/ML
5 INJECTION INTRAMUSCULAR; INTRAVENOUS
Status: DISCONTINUED | OUTPATIENT
Start: 2021-08-13 | End: 2021-08-13 | Stop reason: HOSPADM

## 2021-08-13 RX ORDER — PROMETHAZINE HYDROCHLORIDE 25 MG/1
25 TABLET ORAL ONCE AS NEEDED
Status: DISCONTINUED | OUTPATIENT
Start: 2021-08-13 | End: 2021-08-13 | Stop reason: HOSPADM

## 2021-08-13 RX ORDER — LIDOCAINE HYDROCHLORIDE 20 MG/ML
INJECTION, SOLUTION INFILTRATION; PERINEURAL AS NEEDED
Status: DISCONTINUED | OUTPATIENT
Start: 2021-08-13 | End: 2021-08-13 | Stop reason: SURG

## 2021-08-13 RX ORDER — LIDOCAINE HYDROCHLORIDE 10 MG/ML
0.5 INJECTION, SOLUTION EPIDURAL; INFILTRATION; INTRACAUDAL; PERINEURAL ONCE AS NEEDED
Status: DISCONTINUED | OUTPATIENT
Start: 2021-08-13 | End: 2021-08-13 | Stop reason: HOSPADM

## 2021-08-13 RX ORDER — NALOXONE HCL 0.4 MG/ML
0.2 VIAL (ML) INJECTION AS NEEDED
Status: DISCONTINUED | OUTPATIENT
Start: 2021-08-13 | End: 2021-08-13 | Stop reason: HOSPADM

## 2021-08-13 RX ORDER — SODIUM CHLORIDE 9 MG/ML
INJECTION, SOLUTION INTRAVENOUS AS NEEDED
Status: DISCONTINUED | OUTPATIENT
Start: 2021-08-13 | End: 2021-08-13 | Stop reason: HOSPADM

## 2021-08-13 RX ORDER — SODIUM CHLORIDE 0.9 % (FLUSH) 0.9 %
3-10 SYRINGE (ML) INJECTION AS NEEDED
Status: DISCONTINUED | OUTPATIENT
Start: 2021-08-13 | End: 2021-08-13 | Stop reason: HOSPADM

## 2021-08-13 RX ORDER — BUPIVACAINE HYDROCHLORIDE AND EPINEPHRINE 5; 5 MG/ML; UG/ML
INJECTION, SOLUTION EPIDURAL; INTRACAUDAL; PERINEURAL AS NEEDED
Status: DISCONTINUED | OUTPATIENT
Start: 2021-08-13 | End: 2021-08-13 | Stop reason: HOSPADM

## 2021-08-13 RX ORDER — DEXAMETHASONE SODIUM PHOSPHATE 10 MG/ML
INJECTION INTRAMUSCULAR; INTRAVENOUS AS NEEDED
Status: DISCONTINUED | OUTPATIENT
Start: 2021-08-13 | End: 2021-08-13 | Stop reason: SURG

## 2021-08-13 RX ORDER — PROPOFOL 10 MG/ML
VIAL (ML) INTRAVENOUS AS NEEDED
Status: DISCONTINUED | OUTPATIENT
Start: 2021-08-13 | End: 2021-08-13 | Stop reason: SURG

## 2021-08-13 RX ORDER — HYDROCODONE BITARTRATE AND ACETAMINOPHEN 7.5; 325 MG/1; MG/1
1 TABLET ORAL ONCE AS NEEDED
Status: COMPLETED | OUTPATIENT
Start: 2021-08-13 | End: 2021-08-13

## 2021-08-13 RX ORDER — FENTANYL CITRATE 50 UG/ML
50 INJECTION, SOLUTION INTRAMUSCULAR; INTRAVENOUS
Status: DISCONTINUED | OUTPATIENT
Start: 2021-08-13 | End: 2021-08-13 | Stop reason: HOSPADM

## 2021-08-13 RX ORDER — OXYCODONE HYDROCHLORIDE AND ACETAMINOPHEN 5; 325 MG/1; MG/1
1 TABLET ORAL EVERY 6 HOURS PRN
Qty: 20 TABLET | Refills: 0 | Status: SHIPPED | OUTPATIENT
Start: 2021-08-13 | End: 2021-08-18

## 2021-08-13 RX ORDER — MAGNESIUM HYDROXIDE 1200 MG/15ML
LIQUID ORAL AS NEEDED
Status: DISCONTINUED | OUTPATIENT
Start: 2021-08-13 | End: 2021-08-13 | Stop reason: HOSPADM

## 2021-08-13 RX ORDER — OXYCODONE AND ACETAMINOPHEN 10; 325 MG/1; MG/1
1 TABLET ORAL EVERY 4 HOURS PRN
Status: DISCONTINUED | OUTPATIENT
Start: 2021-08-13 | End: 2021-08-13 | Stop reason: HOSPADM

## 2021-08-13 RX ORDER — ONDANSETRON 2 MG/ML
INJECTION INTRAMUSCULAR; INTRAVENOUS AS NEEDED
Status: DISCONTINUED | OUTPATIENT
Start: 2021-08-13 | End: 2021-08-13 | Stop reason: SURG

## 2021-08-13 RX ORDER — HYDROMORPHONE HYDROCHLORIDE 1 MG/ML
0.5 INJECTION, SOLUTION INTRAMUSCULAR; INTRAVENOUS; SUBCUTANEOUS
Status: DISCONTINUED | OUTPATIENT
Start: 2021-08-13 | End: 2021-08-13 | Stop reason: HOSPADM

## 2021-08-13 RX ORDER — SCOLOPAMINE TRANSDERMAL SYSTEM 1 MG/1
1 PATCH, EXTENDED RELEASE TRANSDERMAL ONCE
Status: DISCONTINUED | OUTPATIENT
Start: 2021-08-13 | End: 2021-08-13 | Stop reason: HOSPADM

## 2021-08-13 RX ORDER — DIAZEPAM 5 MG/1
5 TABLET ORAL ONCE
COMMUNITY
Start: 2021-08-11 | End: 2021-08-27

## 2021-08-13 RX ORDER — DIPHENHYDRAMINE HCL 25 MG
25 CAPSULE ORAL
Status: DISCONTINUED | OUTPATIENT
Start: 2021-08-13 | End: 2021-08-13 | Stop reason: HOSPADM

## 2021-08-13 RX ORDER — HYDROMORPHONE HCL 110MG/55ML
PATIENT CONTROLLED ANALGESIA SYRINGE INTRAVENOUS AS NEEDED
Status: DISCONTINUED | OUTPATIENT
Start: 2021-08-13 | End: 2021-08-13 | Stop reason: SURG

## 2021-08-13 RX ORDER — FLUMAZENIL 0.1 MG/ML
0.2 INJECTION INTRAVENOUS AS NEEDED
Status: DISCONTINUED | OUTPATIENT
Start: 2021-08-13 | End: 2021-08-13 | Stop reason: HOSPADM

## 2021-08-13 RX ORDER — FENTANYL CITRATE 50 UG/ML
INJECTION, SOLUTION INTRAMUSCULAR; INTRAVENOUS AS NEEDED
Status: DISCONTINUED | OUTPATIENT
Start: 2021-08-13 | End: 2021-08-13 | Stop reason: SURG

## 2021-08-13 RX ORDER — ONDANSETRON 2 MG/ML
4 INJECTION INTRAMUSCULAR; INTRAVENOUS ONCE AS NEEDED
Status: DISCONTINUED | OUTPATIENT
Start: 2021-08-13 | End: 2021-08-13 | Stop reason: HOSPADM

## 2021-08-13 RX ORDER — LABETALOL HYDROCHLORIDE 5 MG/ML
5 INJECTION, SOLUTION INTRAVENOUS
Status: DISCONTINUED | OUTPATIENT
Start: 2021-08-13 | End: 2021-08-13 | Stop reason: HOSPADM

## 2021-08-13 RX ORDER — MIDAZOLAM HYDROCHLORIDE 1 MG/ML
1 INJECTION INTRAMUSCULAR; INTRAVENOUS
Status: COMPLETED | OUTPATIENT
Start: 2021-08-13 | End: 2021-08-13

## 2021-08-13 RX ORDER — FAMOTIDINE 10 MG/ML
20 INJECTION, SOLUTION INTRAVENOUS ONCE
Status: COMPLETED | OUTPATIENT
Start: 2021-08-13 | End: 2021-08-13

## 2021-08-13 RX ORDER — SODIUM CHLORIDE 0.9 % (FLUSH) 0.9 %
3 SYRINGE (ML) INJECTION EVERY 12 HOURS SCHEDULED
Status: DISCONTINUED | OUTPATIENT
Start: 2021-08-13 | End: 2021-08-13 | Stop reason: HOSPADM

## 2021-08-13 RX ORDER — EPHEDRINE SULFATE 50 MG/ML
5 INJECTION, SOLUTION INTRAVENOUS ONCE AS NEEDED
Status: DISCONTINUED | OUTPATIENT
Start: 2021-08-13 | End: 2021-08-13 | Stop reason: HOSPADM

## 2021-08-13 RX ORDER — SODIUM CHLORIDE, SODIUM LACTATE, POTASSIUM CHLORIDE, CALCIUM CHLORIDE 600; 310; 30; 20 MG/100ML; MG/100ML; MG/100ML; MG/100ML
9 INJECTION, SOLUTION INTRAVENOUS CONTINUOUS
Status: DISCONTINUED | OUTPATIENT
Start: 2021-08-13 | End: 2021-08-13 | Stop reason: HOSPADM

## 2021-08-13 RX ORDER — GLYCOPYRROLATE 0.2 MG/ML
INJECTION INTRAMUSCULAR; INTRAVENOUS AS NEEDED
Status: DISCONTINUED | OUTPATIENT
Start: 2021-08-13 | End: 2021-08-13 | Stop reason: SURG

## 2021-08-13 RX ORDER — NEOSTIGMINE METHYLSULFATE 0.5 MG/ML
INJECTION, SOLUTION INTRAVENOUS AS NEEDED
Status: DISCONTINUED | OUTPATIENT
Start: 2021-08-13 | End: 2021-08-13 | Stop reason: SURG

## 2021-08-13 RX ADMIN — FENTANYL CITRATE 50 MCG: 50 INJECTION INTRAMUSCULAR; INTRAVENOUS at 14:29

## 2021-08-13 RX ADMIN — DEXAMETHASONE SODIUM PHOSPHATE 8 MG: 10 INJECTION INTRAMUSCULAR; INTRAVENOUS at 13:41

## 2021-08-13 RX ADMIN — ROCURONIUM BROMIDE 30 MG: 50 INJECTION INTRAVENOUS at 13:37

## 2021-08-13 RX ADMIN — HYDROMORPHONE HYDROCHLORIDE 0.5 MG: 1 INJECTION, SOLUTION INTRAMUSCULAR; INTRAVENOUS; SUBCUTANEOUS at 15:00

## 2021-08-13 RX ADMIN — MIDAZOLAM 1 MG: 1 INJECTION INTRAMUSCULAR; INTRAVENOUS at 13:03

## 2021-08-13 RX ADMIN — FENTANYL CITRATE 50 MCG: 50 INJECTION, SOLUTION INTRAMUSCULAR; INTRAVENOUS at 14:59

## 2021-08-13 RX ADMIN — SODIUM CHLORIDE, POTASSIUM CHLORIDE, SODIUM LACTATE AND CALCIUM CHLORIDE 9 ML/HR: 600; 310; 30; 20 INJECTION, SOLUTION INTRAVENOUS at 12:19

## 2021-08-13 RX ADMIN — FAMOTIDINE 20 MG: 10 INJECTION INTRAVENOUS at 12:18

## 2021-08-13 RX ADMIN — HYDROMORPHONE HYDROCHLORIDE 0.5 MG: 2 INJECTION, SOLUTION INTRAMUSCULAR; INTRAVENOUS; SUBCUTANEOUS at 14:44

## 2021-08-13 RX ADMIN — LIDOCAINE HYDROCHLORIDE 60 MG: 20 INJECTION, SOLUTION INFILTRATION; PERINEURAL at 13:37

## 2021-08-13 RX ADMIN — KETOROLAC TROMETHAMINE 30 MG: 30 INJECTION, SOLUTION INTRAMUSCULAR at 14:19

## 2021-08-13 RX ADMIN — HYDROCODONE BITARTRATE AND ACETAMINOPHEN 1 TABLET: 7.5; 325 TABLET ORAL at 15:36

## 2021-08-13 RX ADMIN — ONDANSETRON 4 MG: 2 INJECTION INTRAMUSCULAR; INTRAVENOUS at 14:17

## 2021-08-13 RX ADMIN — GLYCOPYRROLATE 0.4 MG: 0.2 INJECTION INTRAMUSCULAR; INTRAVENOUS at 14:27

## 2021-08-13 RX ADMIN — PROPOFOL 160 MG: 10 INJECTION, EMULSION INTRAVENOUS at 13:37

## 2021-08-13 RX ADMIN — FENTANYL CITRATE 50 MCG: 50 INJECTION INTRAMUSCULAR; INTRAVENOUS at 13:35

## 2021-08-13 RX ADMIN — ONDANSETRON 4 MG: 2 INJECTION INTRAMUSCULAR; INTRAVENOUS at 12:18

## 2021-08-13 RX ADMIN — SCOLOPAMINE TRANSDERMAL SYSTEM 1 PATCH: 1 PATCH, EXTENDED RELEASE TRANSDERMAL at 12:18

## 2021-08-13 RX ADMIN — MIDAZOLAM 1 MG: 1 INJECTION INTRAMUSCULAR; INTRAVENOUS at 12:18

## 2021-08-13 RX ADMIN — NEOSTIGMINE METHYLSULFATE 2 MG: 0.5 INJECTION INTRAVENOUS at 14:27

## 2021-08-13 NOTE — H&P
[facility]   HISTORY AND PHYSICAL    Patient Name: Preethi Jules  : 1985  MRN: [id number]  Primary Care Physician:  Francisco Javier Burch MD  Date of admission: 2021    Subjective   Subjective     Chief Complaint: bad periods    History of Present Illness     35 y.o.    Complaints of irregular bleeding and pain in pelvis  Evaluation showed uterine enlargement and fibroid   Here to try and treat with ablation, and for permanent voluntary sterilization with LTC.       Review of Systems   Constitutional: Negative for chills and fever.   Gastrointestinal: Negative for abdominal pain.   Genitourinary: Positive for menstrual problem and pelvic pain. Negative for dysuria, vaginal bleeding and vaginal discharge.   All other systems reviewed and are negative.       Personal History     Past Medical History:   Diagnosis Date   • Anxiety    • Chest pain     history of stress test 2021   • Depression    • GERD (gastroesophageal reflux disease)    • H pylori ulcer     history as a teen ager   • Headache    • Kidney stones    • MRSA infection     [facility]   • Psoriasis        Past Surgical History:   Procedure Laterality Date   • CARPAL TUNNEL RELEASE Bilateral    •  SECTION     • DILATATION AND CURETTAGE         Family History: family history includes Lung cancer in her father; Ovarian cancer in her paternal grandmother. Otherwise pertinent FHx was reviewed and not pertinent to current issue.    Social History:  reports that she quit smoking about 9 years ago. Her smoking use included cigarettes. She has a 3.00 pack-year smoking history. She has never used smokeless tobacco. She reports previous alcohol use. She reports previous drug use.    Home Medications:  Chlorhexidine Gluconate Cloth, acetaminophen, diazePAM, and omeprazole    Allergies:  No Known Allergies    Objective    Objective     Vitals:   Temp:  [98.2 °F (36.8 °C)] 98.2 °F (36.8 °C)  Heart Rate:  [65-68]  68  Resp:  [16-18] 16  BP: (120)/(71) 120/71    Physical Exam  Constitutional:       General: She is not in acute distress.     Appearance: She is well-developed.   HENT:      Head: Normocephalic and atraumatic.   Eyes:      General:         Right eye: No discharge.         Left eye: No discharge.      Conjunctiva/sclera: Conjunctivae normal.   Neck:      Thyroid: No thyromegaly.   Cardiovascular:      Rate and Rhythm: Normal rate and regular rhythm.      Heart sounds: Normal heart sounds. No murmur heard.     Pulmonary:      Effort: Pulmonary effort is normal. No respiratory distress.      Breath sounds: Normal breath sounds.   Abdominal:      General: Bowel sounds are normal. There is no distension.      Palpations: Abdomen is soft.      Tenderness: There is no abdominal tenderness.   Musculoskeletal:         General: Normal range of motion.      Cervical back: Normal range of motion and neck supple.   Lymphadenopathy:      Cervical: No cervical adenopathy.   Skin:     General: Skin is warm and dry.      Findings: No rash.   Neurological:      Mental Status: She is alert and oriented to person, place, and time.   Psychiatric:         Behavior: Behavior normal.         Thought Content: Thought content normal.         Judgment: Judgment normal.         Result Review      Lab Results   Component Value Date    WBC 8.55 08/11/2021    HGB 13.7 08/11/2021    HCT 43.4 08/11/2021    MCV 88.8 08/11/2021     08/11/2021     HCG 8/11/21 - Negative   COVID 8/11/21 -ND   Normal BMP     Assessment/Plan   Assessment / Plan     Brief Patient Summary:  Preethi Jules is a 35 y.o. female who has issue with pain, bleeding and a fibroid, wants to see if ablation will help her situation. To assist with contraception, going to proceed with sterilization with LTC.     Active Hospital Problems:  Active Hospital Problems    Diagnosis    • Pelvic pain      Added automatically from request for surgery [id number]     • Abnormal uterine  bleeding (AUB)      Added automatically from request for surgery [id number]     • Intramural uterine fibroid      Added automatically from request for surgery [id number]     • Encounter for sterilization      Added automatically from request for surgery [id number]       Plan:   LTC with Hysteroscopy, D&C and ablation     Various options discussed including natural family planning and abstinence. Pt still wishes for permanent sterilization.The risks, benefits, and alternatives were discussed including surgery risks such as failure of sterilization method with future pregnancy, ectopic pregnancy, bleeding, infection, scar, pain, wound breakdown, conversion to open approach, GI/ injury, DVT, nerve damage, organ failure, anesthesia complications, and death. Discussed preop and typical recovery.   Discussed risk of future pregnancy and regret. All questions answered.   Management options discussed at length today including expectant, medical, and surgical. Patient elects for an endometrial ablation. Discussed high rates of satisfaction (around 90 %) but also the potential for failure and persistent heavy or painful periods with potential need for future surgery, including hysterectomy. Patient verbalized understanding. Discussed risks of surgery including bleeding, transfusion, infection, scaring, dyspareunia, irregular periods post op, post op pain, uterine perforation and potential need for surgical correction, inadvertent injury to GI/ structures and potential need for surgical correction, DVT, anesthesia complications, organ failure, nerve damage, and death. Discussed preop process and typicaly post op/recovery. Will plan hysteroscopy, D&C, ablation.    DVT prophylaxis:  No DVT prophylaxis order currently exists.    CODE STATUS:       Admission Status:  I believe this patient meets outpatient status.    Electronically signed by Braulio Olson MD, 08/13/21, 12:43 PM EDT.

## 2021-08-13 NOTE — PERIOPERATIVE NURSING NOTE
Dr. Tinsley notified that pt drank 4 ounces of orange juice this am 0730.  Ok for amdi today per Md.

## 2021-08-13 NOTE — ANESTHESIA POSTPROCEDURE EVALUATION
"Patient: Preethi Jules    Procedure Summary     Date: 08/13/21 Room / Location: [facility] OR  / [facility] MAIN OR    Anesthesia Start: 1331 Anesthesia Stop: 1450    Procedures:       DILATATION AND CURETTAGE HYSTEROSCOPY NOVASURE ENDOMETRIAL ABLATION (N/A Vagina)      TUBAL COAGULATION LAPAROSCOPIC (Bilateral Abdomen) Diagnosis:       Pelvic pain      Abnormal uterine bleeding (AUB)      Intramural uterine fibroid      Encounter for sterilization      (Pelvic pain [R10.2])      (Abnormal uterine bleeding (AUB) [N93.9])      (Intramural uterine fibroid [D25.1])      (Encounter for sterilization [Z30.2])    Surgeons: Braulio Olson MD Provider: Jaylon Easley MD    Anesthesia Type: general ASA Status: 2          Anesthesia Type: general    Vitals  Vitals Value Taken Time   /81 08/13/21 1536   Temp 36.4 °C (97.6 °F) 08/13/21 1447   Pulse 76 08/13/21 1541   Resp 16 08/13/21 1535   SpO2 99 % 08/13/21 1542   Vitals shown include unvalidated device data.        Post Anesthesia Care and Evaluation    Patient location during evaluation: bedside  Patient participation: complete - patient participated  Level of consciousness: awake  Pain score: 0  Pain management: adequate  Airway patency: patent  Anesthetic complications: No anesthetic complications  PONV Status: none  Cardiovascular status: acceptable  Respiratory status: acceptable  Hydration status: acceptable    Comments: /81   Pulse 70   Temp 36.4 °C (97.6 °F) (Oral)   Resp 16   Ht 162.6 cm (64\")   Wt 84.6 kg (186 lb 9.6 oz)   LMP 07/28/2021   SpO2 98%   BMI 32.03 kg/m²       "

## 2021-08-13 NOTE — ANESTHESIA PROCEDURE NOTES
Airway  Urgency: elective    Date/Time: 8/13/2021 1:39 PM    General Information and Staff    Patient location during procedure: OR  Anesthesiologist: Jaylon Easley MD    Indications and Patient Condition  Indications for airway management: airway protection    Preoxygenated: yes  MILS maintained throughout  Mask difficulty assessment: 1 - vent by mask    Final Airway Details  Final airway type: endotracheal airway      Successful airway: ETT  Cuffed: yes   Successful intubation technique: direct laryngoscopy  Endotracheal tube insertion site: oral  Blade: Edwige  Blade size: 3  ETT size (mm): 7.0  Cormack-Lehane Classification: grade I - full view of glottis  Placement verified by: capnometry   Measured from: teeth  ETT/EBT  to teeth (cm): 20  Number of attempts at approach: 1  Assessment: lips, teeth, and gum same as pre-op and atraumatic intubation

## 2021-08-13 NOTE — OP NOTE
Hysteroscopy with endometrial ablation & Laparoscopy tubal cauterization Procedure Note    Indications: The patient is a 35 y.o. female  who desires sterilization and treatment of her heavy menstrual bleeding, her paperwork is in order.     Pre-operative Diagnosis: 1) Desires permanent sterilization                                                2) AUB                                               3) Fibroid                                               4) Pelvic pain     Post-operative Diagnosis: 1) Desires permanent sterilization                                                 2) AUB                                                 3) Fibroid                                                 4) Pelvic pain     Surgeon: Braulio Olson MD     Assistants: none     Anesthesia: General endotracheal anesthesia    ASA Class: per anesthsia      Procedure Details   The patient was seen in the Holding Room. The risks, benefits, complications, treatment options, and expected outcomes were discussed with the patient. The possibilities of reaction to medication, pulmonary aspiration, perforation of viscus, bleeding, recurrent infection, the need for additional procedures, failure to diagnose a condition, and creating a complication requiring transfusion or operation were discussed with the patient. The patient concurred with the proposed plan, giving informed consent. The patient was taken to the Operating Room, identified as Preethi Jules and the procedure verified as laparoscopic tubal cautery, hysteroscopy and endometrial ablation. A Time Out was held and the above information confimed.    After induction of general anesthesia, the patient was placed in modified dorsal lithotomy position where she was prepped, draped, and catheterized in the normal, sterile fashion.     I then placed a weighted speculum in her vagina and grasped the anterior cervix. A paracervical block was then done with 5 cc of 0.5% marcaine with epi  injected at the reflection of her cervix and fornix at 4 and 8 o'clock. The uterus and cervix were sounded with the following measurements - Cx 3.5 cm and full uterine length 9.0 cm. The cervix was then opened with graduated Jamaican cervical dilators to approximately 15 Jamaican. The hysteroscope was then inserted into her uterine cavity with no abnormalities noted.  Both tubal ostia were seen an the cavity was noted to have not projections or defects.      The Novasure device was then placed into the cavity with a length of 5.5 cm and determined width of 4.7 cm and power of 142 conley. Cavity assessment was passed on first attempt the device was then enabled and the ablation performed.  It lasted for 65 seconds.       After removal of the novasure device, I reinserted the hysteroscope and noted good global charring throughout the cavity.      The tenaculum was removed after a intrauterine manipulator was placed.    The infraumbilical site was then injected with 10 cc of 0.5% Marcaine with epi. A 1 cm umbilical incision was then performed. This was cut down to the fascia. Once the fascia was incised and entered, stay sutures of 0-Vicryl were placed to secure the Hasan Trocar. Once pneumoperitoneum was sufficient and appropriate anatomy identified, I proceeded with the tubal.     The tube was followed to the fimbriated end and then a section of tube 3 cm long in the midportion was repeated cauterized using the Kleppinger forceps. The same procedure was done on the contralateral side.      Inspection of the remaining abdominal and pelvic anatomy was normal.      Following the procedure the umbilical sheath was removed after intra-abdominal carbon dioxide was expressed. The incision was closed on the fascial layer with 0-Vicryl in a running fashion and subcuticular sutures of 4-0 Vicryl.    Derma bond was placed.      The intrauterine manipulator was then removed, cervix inspected and seen to be hemostatic.       Instrument, sponge, and needle counts were correct prior to abdominal closure and at the conclusion of the case.     Findings:  Normal pelvic anatomy   Hysteroscopy was normal with 9 cm uterus, both tubal ostia seen  Cervix was 3.5 cm, cavity 5.5 cm long, 4.7 cm wide  Power 142, burn 62 seconds  Burn looked global following ablation     Estimated Blood Loss:  10 cc            Drains: none                  Specimens: none                 Complications:  None; patient tolerated the procedure well.           Disposition: PACU - hemodynamically stable.           Condition: stable    Braulio Olson MD   8/13/2021  14:40 EDT

## 2021-08-13 NOTE — ANESTHESIA PREPROCEDURE EVALUATION
Anesthesia Evaluation     Patient summary reviewed and Nursing notes reviewed   no history of anesthetic complications:  NPO Solid Status: > 8 hours  NPO Liquid Status: > 2 hours           Airway   Mallampati: II  TM distance: >3 FB  Neck ROM: full  no difficulty expected  Dental - normal exam     Pulmonary - negative pulmonary ROS and normal exam   (-) COPD, asthma, shortness of breath, recent URI, not a smoker, lung cancer, no home oxygen  Cardiovascular - negative cardio ROS and normal exam  Exercise tolerance: excellent (>7 METS)    ECG reviewed  Rhythm: regular  Rate: normal    (-) hypertension, valvular problems/murmurs, past MI, CAD, dysrhythmias, angina, CHF, cardiac stents, CABG      Neuro/Psych  (+) headaches, psychiatric history Anxiety and Depression,     (-) seizures, neuromuscular disease, TIA, CVA  GI/Hepatic/Renal/Endo    (+) obesity,  GERD well controlled, PUD,  renal disease stones,   (-) hiatal hernia, hepatitis, liver disease, diabetes, GI bleed, no thyroid disorder    Musculoskeletal (-) negative ROS    Abdominal  - normal exam   Substance History - negative use     OB/GYN negative ob/gyn ROS         Other - negative ROS                       Anesthesia Plan    ASA 2     general     intravenous induction     Anesthetic plan, all risks, benefits, and alternatives have been provided, discussed and informed consent has been obtained with: patient.    Plan discussed with CRNA.

## 2021-08-13 NOTE — DISCHARGE INSTRUCTIONS
Scopolamine Patch  This patch has been applied to the skin behind one of your ears.  It may stay in place up to 24 hours. You may remove it at any time after your surgery; however, it should be removed after you are up and walking around the next day.  This medicine reduces stomach upset. Side effects may include: dry mouth, dizziness, sleepiness, constipation, or upset stomach.  An allergy would show up as: a rash, itching, wheezing or shortness of breath.  Follow these instructions:  1. Do not drink alcohol, drive or operate machinery while taking this medicine.  2. Wear only 1 patch at a time. You can leave the patch on for up to 24 hours.  3. When you remove the patch, fold it in half with the sticky sides together and throw it away. Wash your hands and the area under the patch.  4. Do not touch your eye with your hand if it has touched the patch.  5. Wash your hands well before and after touching the patch.  6. Sit or stand slowly to avoid dizziness.  Call your doctor if you have:  1. Any sign of allergy  2. No relief  3. Trouble passing urine  Any new or severe symptomsDilatation and Curettage, Care After  Refer to this sheet for the next few weeks. These instructions provide you with information on caring for yourself after your procedure. Your health care provider may also give you more specific instructions.  Your treatment has been planned according to current medical practices, but problems sometimes occur.  Call your health care provider if you have any problems or questions after your care.  ? HOME CARE INSTRUCTIONS  1. It is normal to have vaginal bleeding/abdominal cramping for the next 2 weeks.  2. Wait 1 week before returning to strenuous activity.  3. Drink enough fluids to keep your urine clear or pale yellow.  4. You may shower tomorrow.  5. Do not take a bath, go swimming or use a hot tub until your health care provider approves.  6. Only take over-the-counter or prescription medicines as  directed by your health care provider.  7. Follow up with your provider as directed.    ? YOU WILL BE ON PELVIC REST FOR THE NEXT 2 WEEKS OR UNTIL SPECIFIED BY YOUR PHYSICIAN. PELVIC REST INCLUDES:  1. Avoiding long periods of standing.  2. Avoiding heavy lifting, pushing or pulling.  DO NOT lift anything heavier than 10 pounds (4.5 kg)  3. DO NOT douche, use tampons, or have sex (intercourse) for 2 weeks after the procedure.    ? SEEK MEDICAL CARE IF:    1. You have increasing cramps or pain that is not relieved with medicine.  2. You have bad smelling vaginal discharge.  3. You are having problems with any medicine.  4. You have bleeding that is heavier than a normal menstrual period (greater than 1 pad/hour) or you notice large clots.  5. You have a fever > 101.  6. You have chest pain or shortness of breath.                 HOW DO I REST MY PELVIS?  For as long as told by your health care provider:  · Do not have sex, sexual stimulation, or an orgasm.  · Do not use tampons. Do not douche. Do not put anything in your vagina.  · Avoid activities that take a lot of effort (are strenuous).  · Avoid any activity in which your pelvic muscles could become strained.    Pelvic rest for 2 weeks   No heavy lifting or driving one week   No tub bath one week

## 2021-08-18 ENCOUNTER — TELEPHONE (OUTPATIENT)
Dept: OBSTETRICS AND GYNECOLOGY | Facility: CLINIC | Age: 36
End: 2021-08-18

## 2021-08-18 ENCOUNTER — OFFICE VISIT (OUTPATIENT)
Dept: OBSTETRICS AND GYNECOLOGY | Facility: CLINIC | Age: 36
End: 2021-08-18

## 2021-08-18 VITALS
SYSTOLIC BLOOD PRESSURE: 128 MMHG | DIASTOLIC BLOOD PRESSURE: 87 MMHG | WEIGHT: 182 LBS | HEIGHT: 64 IN | BODY MASS INDEX: 31.07 KG/M2 | HEART RATE: 82 BPM

## 2021-08-18 DIAGNOSIS — Z30.2 ENCOUNTER FOR STERILIZATION: ICD-10-CM

## 2021-08-18 DIAGNOSIS — D25.1 INTRAMURAL UTERINE FIBROID: ICD-10-CM

## 2021-08-18 DIAGNOSIS — N93.9 ABNORMAL UTERINE BLEEDING (AUB): ICD-10-CM

## 2021-08-18 DIAGNOSIS — G89.18 POST-OP PAIN: Primary | ICD-10-CM

## 2021-08-18 DIAGNOSIS — R10.2 PELVIC PAIN: ICD-10-CM

## 2021-08-18 PROCEDURE — 99024 POSTOP FOLLOW-UP VISIT: CPT | Performed by: OBSTETRICS & GYNECOLOGY

## 2021-08-18 RX ORDER — POLYETHYLENE GLYCOL 3350 17 G/17G
POWDER, FOR SOLUTION ORAL
COMMUNITY
Start: 2021-08-15 | End: 2021-08-27

## 2021-08-18 RX ORDER — OXYCODONE HYDROCHLORIDE AND ACETAMINOPHEN 5; 325 MG/1; MG/1
1 TABLET ORAL EVERY 6 HOURS PRN
Qty: 10 TABLET | Refills: 0 | Status: SHIPPED | OUTPATIENT
Start: 2021-08-18

## 2021-08-18 NOTE — TELEPHONE ENCOUNTER
Good morning,  Patient is calling to see if you could call in a refill of RX-oxyCODONE-acetaminophen (PERCOCET) 5-325 MG per tablet     Patient stated that she has 1 left and would like about 2 more days worth of this medication because she is still having pain.    Please advise,  Thank you      Pharmacy confirmed- [id number] [place], [place] - [address] AT [facility] [phone] [facility] [phone] FX

## 2021-08-18 NOTE — PROGRESS NOTES
"Subjective    is a 35 y.o. female s/p TL on 2021. Pt is having abdominal pain, redness at naval site, and constipation. Pt is requesting a refill on pain medication.     Chief Complaint   Patient presents with   • Post-op        HPI     35 y.o.    S/P LTC and Novasure   Concerned for abdominal pain   Sharp pain around navel.         Review of Systems   Constitutional: Negative for fever.   Gastrointestinal: Positive for abdominal pain and constipation. Negative for diarrhea.   Genitourinary: Positive for pelvic pain. Negative for dysuria and vaginal bleeding.        Objective   /87   Pulse 82   Ht 162.6 cm (64\")   Wt 82.6 kg (182 lb)   LMP 2021   Breastfeeding No   BMI 31.24 kg/m²   Physical Exam  Constitutional:       General: She is not in acute distress.     Appearance: Normal appearance. She is well-developed and normal weight.   Neck:      Thyroid: No thyromegaly.   Pulmonary:      Effort: No respiratory distress.   Abdominal:      General: Abdomen is flat. There is no distension (incision - appears normal for POD#5 ).      Palpations: Abdomen is soft.      Tenderness: There is no abdominal tenderness. There is no guarding or rebound.   Musculoskeletal:      Right lower leg: No edema.      Left lower leg: No edema.   Neurological:      Mental Status: She is alert and oriented to person, place, and time.   Skin:     General: Skin is warm and dry.   Psychiatric:         Behavior: Behavior normal.         Thought Content: Thought content normal.         Judgment: Judgment normal.   Vitals reviewed.          Assessment/Plan   Diagnoses and all orders for this visit:    1. Post-op pain (Primary)    2. Encounter for sterilization  -     oxyCODONE-acetaminophen (PERCOCET) 5-325 MG per tablet; Take 1 tablet by mouth Every 6 (Six) Hours As Needed (Pain).  Dispense: 10 tablet; Refill: 0    3. Intramural uterine fibroid  -     oxyCODONE-acetaminophen (PERCOCET) 5-325 MG per tablet; Take 1 " tablet by mouth Every 6 (Six) Hours As Needed (Pain).  Dispense: 10 tablet; Refill: 0    4. Abnormal uterine bleeding (AUB)  -     oxyCODONE-acetaminophen (PERCOCET) 5-325 MG per tablet; Take 1 tablet by mouth Every 6 (Six) Hours As Needed (Pain).  Dispense: 10 tablet; Refill: 0    5. Pelvic pain  -     oxyCODONE-acetaminophen (PERCOCET) 5-325 MG per tablet; Take 1 tablet by mouth Every 6 (Six) Hours As Needed (Pain).  Dispense: 10 tablet; Refill: 0      1) Post operative from ablation/LTC last week   Having pain and constipation   Reviewed expectations, warning signs   Rx #10 more percocet  Discussed issue with constipation     Braulio Olson MD   8/18/2021  13:19 EDT

## 2021-08-27 ENCOUNTER — OFFICE VISIT (OUTPATIENT)
Dept: OBSTETRICS AND GYNECOLOGY | Facility: CLINIC | Age: 36
End: 2021-08-27

## 2021-08-27 VITALS — HEIGHT: 64 IN | BODY MASS INDEX: 32.78 KG/M2 | WEIGHT: 192 LBS

## 2021-08-27 DIAGNOSIS — Z98.890 POST-OPERATIVE STATE: Primary | ICD-10-CM

## 2021-08-27 PROCEDURE — 99024 POSTOP FOLLOW-UP VISIT: CPT | Performed by: OBSTETRICS & GYNECOLOGY

## 2021-08-27 RX ORDER — OXYCODONE AND ACETAMINOPHEN 10; 325 MG/1; MG/1
1 TABLET ORAL 2 TIMES DAILY
COMMUNITY
Start: 2021-08-25

## 2021-08-27 RX ORDER — OMEPRAZOLE 40 MG/1
CAPSULE, DELAYED RELEASE ORAL
COMMUNITY

## 2021-08-27 RX ORDER — CYANOCOBALAMIN 1000 UG/ML
INJECTION, SOLUTION INTRAMUSCULAR; SUBCUTANEOUS
COMMUNITY

## 2021-08-27 NOTE — PROGRESS NOTES
"Subjective    is a 35 y.o. female here for Post op exam. Pt is s/p Novasure with D&C and TL on 2021. Pt still has pain around naval, when she sits up and gets up in the mornings.     Chief Complaint   Patient presents with   • Post-op        HPI     35 y.o.    S/P LTC, Melissa   Overall doing well, some constipation       Review of Systems   Constitutional: Negative for fever.   Gastrointestinal: Positive for constipation. Negative for abdominal pain and diarrhea.   Genitourinary: Positive for vaginal discharge. Negative for pelvic pain.        Objective   Ht 162.6 cm (64\")   Wt 87.1 kg (192 lb)   LMP 2021   BMI 32.96 kg/m²   Physical Exam  Constitutional:       General: She is not in acute distress.     Appearance: Normal appearance. She is well-developed and normal weight.   Genitourinary:      Pelvic exam was performed with patient supine.      Vagina, uterus, right adnexa and left adnexa normal.      No vulval lesion or Bartholin's cyst noted.      No inguinal adenopathy present in the right or left side.     No vaginal discharge or bleeding.      No cervical motion tenderness or friability.      Uterus is not enlarged or tender.      No uterine mass detected.     Uterus is anteverted and regular.      No right or left adnexal mass present.      Right adnexa not tender or full.      Left adnexa not tender or full.   Abdominal:      General: Abdomen is flat. There is no distension (Incision - clean, dry and intact ).      Palpations: Abdomen is soft.      Tenderness: There is no abdominal tenderness.   Lymphadenopathy:      Lower Body: No right inguinal adenopathy. No left inguinal adenopathy.   Neurological:      Mental Status: She is alert.   Vitals reviewed. Exam conducted with a chaperone present.          Assessment/Plan   Diagnoses and all orders for this visit:    1. Post-operative state (Primary)    Reviewed expectations   Released from restrictions  Follow up [place]NE [place] " MD Whitney   8/27/2021  11:54 EDT

## 2024-11-11 ENCOUNTER — TELEPHONE (OUTPATIENT)
Dept: OBSTETRICS AND GYNECOLOGY | Facility: CLINIC | Age: 39
End: 2024-11-11
Payer: COMMERCIAL

## 2024-11-11 NOTE — TELEPHONE ENCOUNTER
Patient last saw Dr Olson over 3 1/2 years ago. Found a polyp/cyst in vaginal area. May she she be worked in soon?

## 2024-11-13 ENCOUNTER — OFFICE VISIT (OUTPATIENT)
Dept: OBSTETRICS AND GYNECOLOGY | Facility: CLINIC | Age: 39
End: 2024-11-13
Payer: COMMERCIAL

## 2024-11-13 VITALS
WEIGHT: 211 LBS | DIASTOLIC BLOOD PRESSURE: 87 MMHG | HEART RATE: 80 BPM | SYSTOLIC BLOOD PRESSURE: 130 MMHG | BODY MASS INDEX: 36.02 KG/M2 | HEIGHT: 64 IN

## 2024-11-13 DIAGNOSIS — Z01.419 ENCOUNTER FOR GYNECOLOGICAL EXAMINATION WITHOUT ABNORMAL FINDING: Primary | ICD-10-CM

## 2024-11-13 RX ORDER — ESOMEPRAZOLE MAGNESIUM 40 MG/1
CAPSULE, DELAYED RELEASE ORAL
COMMUNITY
Start: 2024-10-22

## 2024-11-13 RX ORDER — BACILLUS COAGULANS/INULIN 1B-250 MG
CAPSULE ORAL
COMMUNITY

## 2024-11-13 NOTE — PROGRESS NOTES
GYN Annual Exam     CC- Here for annual exam.     Preethi Jules is a 38 y.o. female who presents for annual well woman exam. Periods are  absent due to ablation.  Pt has a cyst on the inside on the inside of her R labia.     OB History          2    Para        Term                AB   1    Living   1         SAB   1    IAB        Ectopic        Molar        Multiple        Live Births                    Current contraception: tubal ligation  History of abnormal Pap smear: Yes, no treatment required   Family history of uterine, colon or ovarian cancer: no  History of abnormal mammogram: no  Family history of breast cancer: no  Pap : 2021 NIL HPV neg       Past Medical History:   Diagnosis Date    Anxiety     Chest pain     history of stress test 2021    Depression     GERD (gastroesophageal reflux disease)     H pylori ulcer     history as a teen ager    Headache     Kidney stones     MRSA infection     [facility]    Psoriasis        Past Surgical History:   Procedure Laterality Date    CARPAL TUNNEL RELEASE Bilateral      SECTION      D & C HYSTEROSCOPY ENDOMETRIAL ABLATION N/A 2021    Procedure: DILATATION AND CURETTAGE HYSTEROSCOPY NOVASURE ENDOMETRIAL ABLATION;  Surgeon: Braulio Olson MD;  Location: [facility];  Service: Obstetrics/Gynecology;  Laterality: N/A;    DILATATION AND CURETTAGE      TUBAL COAGULATION LAPAROSCOPIC Bilateral 2021    Procedure: TUBAL COAGULATION LAPAROSCOPIC;  Surgeon: Braulio Olson MD;  Location: [facility];  Service: Obstetrics/Gynecology;  Laterality: Bilateral;         Current Outpatient Medications:     esomeprazole (nexIUM) 40 MG capsule, , Disp: , Rfl:     Bacillus Coagulans-Inulin (Probiotic) 1-250 BILLION-MG capsule, Take  by mouth., Disp: , Rfl:     oxyCODONE-acetaminophen (PERCOCET)  MG per tablet, Take 1 tablet by mouth 2 (Two) Times a Day., Disp: , Rfl:     No Known Allergies    Social  "History     Tobacco Use    Smoking status: Former     Current packs/day: 0.00     Average packs/day: 0.5 packs/day for 6.0 years (3.0 ttl pk-yrs)     Types: Cigarettes     Start date:      Quit date:      Years since quittin.8    Smokeless tobacco: Never   Vaping Use    Vaping status: Never Used   Substance Use Topics    Alcohol use: Not Currently    Drug use: Not Currently       Family History   Problem Relation Age of Onset    Lung cancer Father     Cervical cancer Paternal Grandmother     Breast cancer Neg Hx     Colon cancer Neg Hx     Uterine cancer Neg Hx     Deep vein thrombosis Neg Hx     Pulmonary embolism Neg Hx     Malig Hyperthermia Neg Hx     Ovarian cancer Neg Hx        Review of Systems   Constitutional:  Negative for chills and fever.   Gastrointestinal:  Negative for abdominal pain, constipation and diarrhea.   Genitourinary:  Negative for menstrual problem, pelvic pain, vaginal bleeding and vaginal discharge.   All other systems reviewed and are negative.      /87   Pulse 80   Ht 162.6 cm (64\")   Wt 95.7 kg (211 lb)   BMI 36.22 kg/m²     Physical Exam  Constitutional:       General: She is not in acute distress.     Appearance: She is well-developed. She is obese.   Genitourinary:      Vulva normal.      There are lesions (Hymenal cyst - 1 cm - 2-3 o'clock) in the vagina.      Right Labia: No lesions or Bartholin's cyst.     Left Labia: No lesions or Bartholin's cyst.     No inguinal adenopathy present in the right or left side.     No vaginal discharge or bleeding.        Right Adnexa: not tender, not full and no mass present.     Left Adnexa: not tender, not full and no mass present.     No cervical motion tenderness or friability.      Uterus is not enlarged or tender.      No uterine mass detected.     Uterus is anteverted.   Breasts:     Right: No inverted nipple, mass or nipple discharge.      Left: No inverted nipple, mass or nipple discharge.   HENT:      Head: " Normocephalic and atraumatic.      Nose: Nose normal.   Eyes:      Conjunctiva/sclera: Conjunctivae normal.      Pupils: Pupils are equal, round, and reactive to light.   Neck:      Thyroid: No thyromegaly.   Cardiovascular:      Rate and Rhythm: Normal rate and regular rhythm.      Heart sounds: Normal heart sounds. No murmur heard.  Pulmonary:      Effort: Pulmonary effort is normal. No respiratory distress.      Breath sounds: Normal breath sounds.   Abdominal:      General: Abdomen is flat. There is no distension.      Palpations: Abdomen is soft.      Tenderness: There is no abdominal tenderness.   Musculoskeletal:         General: No deformity. Normal range of motion.      Cervical back: Normal range of motion and neck supple.      Right lower leg: No edema.      Left lower leg: No edema.   Lymphadenopathy:      Lower Body: No right inguinal adenopathy. No left inguinal adenopathy.   Neurological:      Mental Status: She is alert and oriented to person, place, and time.   Skin:     General: Skin is warm and dry.      Findings: No erythema.   Psychiatric:         Behavior: Behavior normal.         Thought Content: Thought content normal.         Judgment: Judgment normal.   Vitals reviewed. Exam conducted with a chaperone present.               Assessment     Diagnoses and all orders for this visit:    1. Encounter for gynecological examination without abnormal finding (Primary)  -     IGP, Apt HPV,rfx 16 / 18,45    GYN BP okay, updated as below     Vaginal cyst - hymenal cyst around 2:00  Reviewed - no intervention at this time   Options are drain with needle in office (recurrence risk) or remove in OR      Plan     1) Breast Health - Clinical breast exam yearly, Discussed [employer] cancer society recommendations for breast cancer screening, and Self breast awareness monthly  CBE normal exam, updated  2) Pap - updated today, expectations reviewed.   3) Smoking status- non-smoker   4) Encouraged between 7-8  hours of good sleep per night.   5) Follow up prn and one year.       Braulio Olson MD   11/13/2024  14:24 EST

## 2024-11-20 LAB
CYTOLOGIST CVX/VAG CYTO: NORMAL
CYTOLOGY CVX/VAG DOC CYTO: NORMAL
CYTOLOGY CVX/VAG DOC THIN PREP: NORMAL
DX ICD CODE: NORMAL
HPV I/H RISK 4 DNA CVX QL PROBE+SIG AMP: NEGATIVE
Lab: NORMAL
OTHER STN SPEC: NORMAL
STAT OF ADQ CVX/VAG CYTO-IMP: NORMAL

## 2024-11-21 ENCOUNTER — TELEPHONE (OUTPATIENT)
Dept: OBSTETRICS AND GYNECOLOGY | Facility: CLINIC | Age: 39
End: 2024-11-21

## 2024-11-21 NOTE — TELEPHONE ENCOUNTER
Caller: Preethi Jules    Relationship to patient: Self    Best call back number: [phone] (home)      Patient is needing: A CALL BACK, SHE HAD A MISSED CALL FROM OFFICE, BUT DECLINED CALL. SHE DIDN'T RECOGNIZE THE NUMBER. PLEASE CALL HER BACK ANYTIME. SHE HAS HER PHONE WITH HER.    SHE HAD A RECENT APPT, NOT SURE IF IT WAS FOR RESULTS?

## (undated) DEVICE — GLV SURG SIGNATURE ESSENTIAL PF LTX SZ7.5

## (undated) DEVICE — PROB ABL ENDOMTRL NOVASURE/G4 W/SURESND

## (undated) DEVICE — SOL NACL 0.9PCT 1000ML

## (undated) DEVICE — SUT VIC 0/0 UR6 27IN DYED J603H

## (undated) DEVICE — NEEDLE, QUINCKE, 20GX3.5": Brand: MEDLINE

## (undated) DEVICE — ADHS SKIN SURG TISS VISC PREMIERPRO EXOFIN HI/VISC FAST/DRY

## (undated) DEVICE — LOU GYN LAPAROSCOPY: Brand: MEDLINE INDUSTRIES, INC.

## (undated) DEVICE — ST IRR CYSTO W/SPK 77IN LF

## (undated) DEVICE — LAPAROSCOPIC SMOKE FILTRATION SYSTEM: Brand: PALL LAPAROSHIELD® PLUS LAPAROSCOPIC SMOKE FILTRATION SYSTEM

## (undated) DEVICE — ENDOPATH XCEL BLUNT TIP TROCARS WITH SMOOTH SLEEVES: Brand: ENDOPATH XCEL

## (undated) DEVICE — GLV SURG BIOGEL LTX PF 7

## (undated) DEVICE — 1000ML,PRESSURE INFUSER W/STOPCOCK: Brand: MEDLINE

## (undated) DEVICE — ANTIBACTERIAL UNDYED BRAIDED (POLYGLACTIN 910), SYNTHETIC ABSORBABLE SUTURE: Brand: COATED VICRYL